# Patient Record
Sex: FEMALE | Race: ASIAN | URBAN - METROPOLITAN AREA
[De-identification: names, ages, dates, MRNs, and addresses within clinical notes are randomized per-mention and may not be internally consistent; named-entity substitution may affect disease eponyms.]

---

## 2023-08-29 ENCOUNTER — APPOINTMENT (OUTPATIENT)
Dept: URBAN - METROPOLITAN AREA CLINIC 246 | Age: 48
Setting detail: DERMATOLOGY
End: 2023-08-31

## 2023-08-29 DIAGNOSIS — L20.89 OTHER ATOPIC DERMATITIS: ICD-10-CM

## 2023-08-29 PROCEDURE — 99203 OFFICE O/P NEW LOW 30 MIN: CPT

## 2023-08-29 PROCEDURE — OTHER MIPS QUALITY: OTHER

## 2023-08-29 PROCEDURE — OTHER PRESCRIPTION: OTHER

## 2023-08-29 PROCEDURE — OTHER PRESCRIPTION MEDICATION MANAGEMENT: OTHER

## 2023-08-29 PROCEDURE — OTHER COUNSELING: OTHER

## 2023-08-29 RX ORDER — HYDROCORTISONE 25 MG/G
OINTMENT TOPICAL
Qty: 28.35 | Refills: 1 | Status: ERX | COMMUNITY
Start: 2023-08-29

## 2023-08-29 RX ORDER — TRIAMCINOLONE ACETONIDE 1 MG/G
CREAM TOPICAL BID
Qty: 453.6 | Refills: 0 | Status: ERX | COMMUNITY
Start: 2023-08-29

## 2023-08-29 ASSESSMENT — LOCATION DETAILED DESCRIPTION DERM
LOCATION DETAILED: RIGHT INFERIOR FOREHEAD
LOCATION DETAILED: LEFT INFERIOR CENTRAL MALAR CHEEK
LOCATION DETAILED: LEFT CENTRAL EYEBROW
LOCATION DETAILED: LEFT PROXIMAL PRETIBIAL REGION
LOCATION DETAILED: RIGHT INFERIOR MEDIAL MALAR CHEEK

## 2023-08-29 ASSESSMENT — LOCATION SIMPLE DESCRIPTION DERM
LOCATION SIMPLE: RIGHT FOREHEAD
LOCATION SIMPLE: LEFT PRETIBIAL REGION
LOCATION SIMPLE: RIGHT CHEEK
LOCATION SIMPLE: LEFT CHEEK
LOCATION SIMPLE: LEFT EYEBROW

## 2023-08-29 ASSESSMENT — LOCATION ZONE DERM
LOCATION ZONE: LEG
LOCATION ZONE: FACE

## 2023-08-29 NOTE — PROCEDURE: PRESCRIPTION MEDICATION MANAGEMENT
Detail Level: Zone
Initiate Treatment: triamcinolone acetonide 0.1 % topical cream BID\\nSig: Apply small amount to affected areas of the lower leg twice daily for 2 weeks on and 1 week off. Repeat as needed for flares.\\n\\nhydrocortisone 2.5 % topical ointment \\nSig: Apply twice daily on the face for two weeks at a time and take a break for a week and repeat as needed for flare ups
Render In Strict Bullet Format?: No

## 2023-08-29 NOTE — PROCEDURE: COUNSELING
Moisturizer Recommendations: Cerave Moisture Cream or Cetaphil Eczema Restoraderm\\nVaseline
Cleanser Recommendations: Gentle fragrance free such as Cerave or Cetaphil
Antihistamine Recommendations: Zyrtec 10mg tabs 1-2x/day
Detail Level: Zone

## 2023-08-29 NOTE — PROCEDURE: MIPS QUALITY
Quality 431: Preventive Care And Screening: Unhealthy Alcohol Use - Screening: Patient not identified as an unhealthy alcohol user when screened for unhealthy alcohol use using a systematic screening method
Quality 47: Advance Care Plan: Advance Care Planning discussed and documented; advance care plan or surrogate decision maker documented in the medical record.
Quality 110: Preventive Care And Screening: Influenza Immunization: Influenza Immunization Administered during Influenza season
Quality 226: Preventive Care And Screening: Tobacco Use: Screening And Cessation Intervention: Patient screened for tobacco use and is an ex/non-smoker
Quality 130: Documentation Of Current Medications In The Medical Record: Current Medications Documented
Detail Level: Detailed

## 2023-09-19 ENCOUNTER — APPOINTMENT (OUTPATIENT)
Dept: URBAN - METROPOLITAN AREA CLINIC 246 | Age: 48
Setting detail: DERMATOLOGY
End: 2023-09-19

## 2023-09-19 DIAGNOSIS — L20.89 OTHER ATOPIC DERMATITIS: ICD-10-CM

## 2023-09-19 PROBLEM — L30.9 DERMATITIS, UNSPECIFIED: Status: ACTIVE | Noted: 2023-09-19

## 2023-09-19 PROCEDURE — OTHER COUNSELING: OTHER

## 2023-09-19 PROCEDURE — OTHER ADDITIONAL NOTES: OTHER

## 2023-09-19 PROCEDURE — 99213 OFFICE O/P EST LOW 20 MIN: CPT

## 2023-09-19 PROCEDURE — OTHER TREATMENT REGIMEN: OTHER

## 2023-09-19 ASSESSMENT — LOCATION ZONE DERM
LOCATION ZONE: FACE
LOCATION ZONE: LEG

## 2023-09-19 ASSESSMENT — LOCATION SIMPLE DESCRIPTION DERM
LOCATION SIMPLE: RIGHT FOREHEAD
LOCATION SIMPLE: RIGHT CHEEK
LOCATION SIMPLE: LEFT FOREHEAD
LOCATION SIMPLE: LEFT PRETIBIAL REGION

## 2023-09-19 ASSESSMENT — SEVERITY ASSESSMENT 2020: SEVERITY 2020: MILD

## 2023-09-19 ASSESSMENT — LOCATION DETAILED DESCRIPTION DERM
LOCATION DETAILED: RIGHT INFERIOR CENTRAL MALAR CHEEK
LOCATION DETAILED: RIGHT INFERIOR FOREHEAD
LOCATION DETAILED: LEFT PROXIMAL PRETIBIAL REGION
LOCATION DETAILED: RIGHT CENTRAL BUCCAL CHEEK
LOCATION DETAILED: LEFT INFERIOR MEDIAL FOREHEAD

## 2023-09-19 NOTE — PROCEDURE: COUNSELING
Moisturizer Recommendations: Cerave Moisture Cream or Cetaphil Eczema Restoraderm\\nVaseline
Cleanser Recommendations: Gentle fragrance free such as Cerave or Cetaphil
Detail Level: Detailed

## 2023-09-19 NOTE — PROCEDURE: ADDITIONAL NOTES
Additional Notes: Plaque on left lower leg significantly improved. PIH remains and skin feels very dry. Marisel says she agrees this area is better but she is afraid it will come back if she stops the topical steroid and says her skin is still itchy. Encouraged frequent moisturizing. Patches above eyebrows still pink and some dryness and PIH by jowl area. \\n\\nReviewed risks of continued topical steroid use including but not limited skin atrophy, increased bruising and hypopigmentation. Patient expressed understanding. \\n\\nRecommended continuing to use the topical steroids as long as she is also taking breaks from it. Once rash clears should stop topical steroid.
Render Risk Assessment In Note?: no
Detail Level: Detailed

## 2023-09-19 NOTE — PROCEDURE: TREATMENT REGIMEN
Plan: Discussed focusing on moisturizing areas twice daily with a heavy moisture cream. Patient has been using Jergens and Tracie. Suggested CeraVe moisture cream or Aveeno Oat Repairing cream. Recommended an antihistamine like Claritin or Zyrtec daily. Discussed patch testing if rash is not improving and able to stop steroid creams.
Detail Level: Simple
Samples Given: Aveeno oat repairing cream
Continue Regimen: 1. Triamcinolone 0.1% cream Apply a small amount to affected area on leg for up to 2 weeks on and 1 week off. Repeat as needed for flares. (patient has rx)\\n2. Hydrocortisone 2.5% ointment Apply a small amount to affected area on face for up to 2 weeks on and 1 week off. Repeat as needed for flares.  (patient has rx)